# Patient Record
Sex: MALE | Race: WHITE | HISPANIC OR LATINO | Employment: OTHER | URBAN - METROPOLITAN AREA
[De-identification: names, ages, dates, MRNs, and addresses within clinical notes are randomized per-mention and may not be internally consistent; named-entity substitution may affect disease eponyms.]

---

## 2017-02-08 ENCOUNTER — APPOINTMENT (OUTPATIENT)
Dept: GENERAL RADIOLOGY | Facility: CLINIC | Age: 27
End: 2017-02-08
Attending: EMERGENCY MEDICINE

## 2017-02-08 ENCOUNTER — HOSPITAL ENCOUNTER (EMERGENCY)
Facility: CLINIC | Age: 27
Discharge: HOME | End: 2017-02-08
Attending: EMERGENCY MEDICINE | Admitting: EMERGENCY MEDICINE

## 2017-02-08 VITALS
DIASTOLIC BLOOD PRESSURE: 43 MMHG | HEIGHT: 72 IN | BODY MASS INDEX: 40.63 KG/M2 | WEIGHT: 300 LBS | HEART RATE: 129 BPM | RESPIRATION RATE: 20 BRPM | OXYGEN SATURATION: 95 % | TEMPERATURE: 97.1 F | SYSTOLIC BLOOD PRESSURE: 107 MMHG

## 2017-02-08 DIAGNOSIS — J10.1 INFLUENZA A: ICD-10-CM

## 2017-02-08 LAB
DEPRECATED S PYO AG THROAT QL EIA: NORMAL
FLUAV+FLUBV AG SPEC QL: ABNORMAL
FLUAV+FLUBV AG SPEC QL: POSITIVE
MICRO REPORT STATUS: NORMAL
SPECIMEN SOURCE: ABNORMAL
SPECIMEN SOURCE: NORMAL

## 2017-02-08 PROCEDURE — 96374 THER/PROPH/DIAG INJ IV PUSH: CPT

## 2017-02-08 PROCEDURE — 71020 XR CHEST 2 VW: CPT

## 2017-02-08 PROCEDURE — 96361 HYDRATE IV INFUSION ADD-ON: CPT

## 2017-02-08 PROCEDURE — 87804 INFLUENZA ASSAY W/OPTIC: CPT | Performed by: EMERGENCY MEDICINE

## 2017-02-08 PROCEDURE — 99284 EMERGENCY DEPT VISIT MOD MDM: CPT | Mod: 25

## 2017-02-08 PROCEDURE — 25000132 ZZH RX MED GY IP 250 OP 250 PS 637: Performed by: EMERGENCY MEDICINE

## 2017-02-08 PROCEDURE — 87880 STREP A ASSAY W/OPTIC: CPT | Performed by: EMERGENCY MEDICINE

## 2017-02-08 PROCEDURE — 25000128 H RX IP 250 OP 636: Performed by: EMERGENCY MEDICINE

## 2017-02-08 PROCEDURE — 87081 CULTURE SCREEN ONLY: CPT | Performed by: EMERGENCY MEDICINE

## 2017-02-08 RX ORDER — ONDANSETRON 4 MG/1
4 TABLET, ORALLY DISINTEGRATING ORAL EVERY 6 HOURS PRN
Qty: 15 TABLET | Refills: 0 | Status: SHIPPED | OUTPATIENT
Start: 2017-02-08 | End: 2017-02-11

## 2017-02-08 RX ORDER — ACETAMINOPHEN 500 MG
1000 TABLET ORAL ONCE
Status: COMPLETED | OUTPATIENT
Start: 2017-02-08 | End: 2017-02-08

## 2017-02-08 RX ORDER — IBUPROFEN 600 MG/1
600 TABLET, FILM COATED ORAL ONCE
Status: COMPLETED | OUTPATIENT
Start: 2017-02-08 | End: 2017-02-08

## 2017-02-08 RX ORDER — BENZONATATE 200 MG/1
200 CAPSULE ORAL 3 TIMES DAILY PRN
Qty: 21 CAPSULE | Refills: 0 | Status: SHIPPED | OUTPATIENT
Start: 2017-02-08

## 2017-02-08 RX ORDER — ONDANSETRON 2 MG/ML
8 INJECTION INTRAMUSCULAR; INTRAVENOUS ONCE
Status: COMPLETED | OUTPATIENT
Start: 2017-02-08 | End: 2017-02-08

## 2017-02-08 RX ADMIN — IBUPROFEN 600 MG: 600 TABLET ORAL at 16:20

## 2017-02-08 RX ADMIN — SODIUM CHLORIDE 1000 ML: 9 INJECTION, SOLUTION INTRAVENOUS at 17:01

## 2017-02-08 RX ADMIN — ACETAMINOPHEN 1000 MG: 500 TABLET, FILM COATED ORAL at 17:02

## 2017-02-08 RX ADMIN — ONDANSETRON 8 MG: 2 INJECTION INTRAMUSCULAR; INTRAVENOUS at 17:02

## 2017-02-08 ASSESSMENT — ENCOUNTER SYMPTOMS
FEVER: 1
SORE THROAT: 1
VOMITING: 0
CHILLS: 1
DIARRHEA: 1
SHORTNESS OF BREATH: 1
HEADACHES: 0
NECK PAIN: 0
RHINORRHEA: 0
NAUSEA: 1
COUGH: 1

## 2017-02-08 NOTE — ED NOTES
Sore throat and fever since yesterday.  Son had influenza last week.   Patient alert and oriented x3.  Airway, breathing and circulation intact.

## 2017-02-08 NOTE — ED AVS SNAPSHOT
[facility] Emergency Department    [address] [place] [address]    Phone:  [phone]    Fax:  [phone]                                       Devyn Carranza   MRN: [id number]    Department:  [facility] Emergency Department   Date of Visit:  2/8/2017           Patient Information     Date Of Birth          1990        Your diagnoses for this visit were:     Influenza A        You were seen by Negrito Baumann MD.        Discharge Instructions       Please make an appointment to follow up with [facility] [phone] in 5-7 days if not improving.      Discharge Instructions  Influenza    You were diagnosed today with influenza or influenza like illness.  Influenza is a respiratory illness caused by influenza A or B viruses.  Influenza causes fever, headache, muscle aches, and fatigue.  These symptoms start one to four days after you have been around a person with this illness.  People with influenza commonly have a dry cough, sore throat, and a runny nose.   Influenza is spread through sneezing and coughing and can live on surfaces for several days.  It is usually contagious for 5 days but in some cases up to 10 days and often affects several family members. If you have a family member who is less than 2 years old, older than 65 years old, pregnant or has a serious medical condition, they should be seen right away by a physician to decide if they should take preventative medications.      Return to the Emergency Department if:    You have trouble breathing.    You develop pain in your chest.    You have signs of being dehydrated, such as dizziness or unable to urinate at least three times daily.    You feel confused.    You cannot stop vomiting or you cannot drink enough fluids.    In children, you should seek help if the child has any of the above or if child:    Has blue or purplish skin color.    Is so irritable that he or  she does not want to be held.    Does not have tears when crying (in infants) or does not urinate at least three times daily.    Does not wake up easily.    Follow-up with your doctor if you are not improving after 5 days.    What can I do to help myself?    Rest.    Fluids -- Drink hydrating solutions such as Gatorade  or Pedialyte  as often as you can. If you are drinking enough, you should pass urine at least every eight hours.    Tylenol  (acetaminophen) and Advil  (ibuprofen) can relieve fever, headache, and muscle aches. Do not give aspirin to children under 18 years old.     Antiviral treatment -- Antiviral medicines do not make the flu symptoms go away immediately.  They have only been shown to make the symptoms go away 12 to 24 hours sooner than they would without treatment.       Antibiotics -- Antibiotics are NOT useful for treating viral illnesses such as influenza. Antibiotics should only be used if there is a bacterial complication of the flu such as bacterial pneumonia, ear infection, or sinusitis.    Because you were diagnosed with a flu like illness you are very contagious.  This means you cannot work, attend school or  for at least 24 hours or until you no longer have a fever.  If you were given a prescription for medicine here today, be sure to read all of the information (including the package insert) that comes with your prescription.  This will include important information about the medicine, its side effects, and any warnings that you need to know about.  The pharmacist who fills the prescription can provide more information and answer questions you may have about the medicine.  If you have questions or concerns that the pharmacist cannot address, please call or return to the Emergency Department.       24 Hour Appointment Hotline       To make an appointment at any [facility], call [phone] ([phone]). If you don't have a family doctor or clinic, we will help you find  one. [facility] are conveniently located to serve the needs of you and your family.             Review of your medicines      START taking        Dose / Directions Last dose taken    benzonatate 200 MG capsule   Commonly known as:  TESSALON   Dose:  200 mg   Quantity:  21 capsule        Take 1 capsule (200 mg) by mouth 3 times daily as needed for cough   Refills:  0        ondansetron 4 MG ODT tab   Commonly known as:  ZOFRAN ODT   Dose:  4 mg   Quantity:  15 tablet        Take 1 tablet (4 mg) by mouth every 6 hours as needed for nausea   Refills:  0          Our records show that you are taking the medicines listed below. If these are incorrect, please call your family doctor or clinic.        Dose / Directions Last dose taken    albuterol 108 (90 BASE) MCG/ACT Inhaler   Commonly known as:  PROAIR HFA/PROVENTIL HFA/VENTOLIN HFA   Dose:  2 puff   Quantity:  1 Inhaler        Inhale 2 puffs into the lungs every 6 hours as needed for shortness of breath / dyspnea or wheezing   Refills:  0        ibuprofen 200 MG tablet   Commonly known as:  ADVIL/MOTRIN   Dose:  600 mg   Quantity:  60 tablet        Take 3 tablets (600 mg) by mouth every 6 hours as needed for mild pain or fever   Refills:  0                Prescriptions were sent or printed at these locations (2 Prescriptions)                   Other Prescriptions                Printed at Department/Unit printer (2 of 2)         benzonatate (TESSALON) 200 MG capsule               ondansetron (ZOFRAN ODT) 4 MG ODT tab                Procedures and tests performed during your visit     Beta strep group A culture    Chest XR,  PA & LAT    Influenza A/B antigen    Rapid strep screen      Orders Needing Specimen Collection     None      Pending Results     Date and Time Order Name Status Description    2/8/2017 1617 Beta strep group A culture In process             Pending Culture Results     Date and Time Order Name Status Description    2/8/2017 1617 Beta strep  group A culture In process        Test Results from your hospital stay           2/8/2017  4:57 PM - Interface, Flexilab Results      Component Results     Component Value Ref Range & Units Status    Influenza A/B Agn Specimen Nasopharyngeal   Swab    Final    Influenza A Positive (A) NEG Final    Influenza B  NEG Final    Negative   Test results must be correlated with clinical data. If necessary, results   should be confirmed by a molecular assay or viral culture.           2/8/2017  4:53 PM - Interface, Flexilab Results      Component Results     Component    Specimen Description    Throat    Rapid Strep A Screen    NEGATIVE: No Group A streptococcal antigen detected by immunoassay, await   culture report.      Micro Report Status    FINAL 02/08/2017 2/8/2017  6:02 PM - Interface, Radiant Ib      Narrative     CHEST TWO VIEWS   2/8/2017 5:46 PM     HISTORY: Fever, cough; LLL rales.    COMPARISON: 1/18/2015.        Impression     IMPRESSION: Normal.    LORI PRITCHETT MD         2/8/2017  4:54 PM - Interface, Flexilab Results                Clinical Quality Measure: Blood Pressure Screening     Your blood pressure was checked while you were in the emergency department today. The last reading we obtained was  BP: 126/76 mmHg . Please read the guidelines below about what these numbers mean and what you should do about them.  If your systolic blood pressure (the top number) is less than 120 and your diastolic blood pressure (the bottom number) is less than 80, then your blood pressure is normal. There is nothing more that you need to do about it.  If your systolic blood pressure (the top number) is 120-139 or your diastolic blood pressure (the bottom number) is 80-89, your blood pressure may be higher than it should be. You should have your blood pressure rechecked within a year by a primary care provider.  If your systolic blood pressure (the top number) is 140 or greater or your diastolic blood pressure (the  "bottom number) is 90 or greater, you may have high blood pressure. High blood pressure is treatable, but if left untreated over time it can put you at risk for heart attack, stroke, or kidney failure. You should have your blood pressure rechecked by a primary care provider within the next 4 weeks.  If your provider in the emergency department today gave you specific instructions to follow-up with your doctor or provider even sooner than that, you should follow that instruction and not wait for up to 4 weeks for your follow-up visit.        Thank you for choosing [facility]       Thank you for choosing [facility] for your care. Our goal is always to provide you with excellent care. Hearing back from our patients is one way we can continue to improve our services. Please take a few minutes to complete the written survey that you may receive in the mail after you visit with us. Thank you!        [facility] Information     [facility] lets you send messages to your doctor, view your test results, renew your prescriptions, schedule appointments and more. To sign up, go to [email or web address] . Click on \"Log in\" on the left side of the screen, which will take you to the Welcome page. Then click on \"Sign up Now\" on the right side of the page.     You will be asked to enter the access code listed below, as well as some personal information. Please follow the directions to create your username and password.     Your access code is: [email or web address]  Expires: 3/15/2017  9:23 PM     Your access code will  in 90 days. If you need help or a new code, please call your [facility] clinic or [phone].        Care EveryWhere ID     This is your Care EveryWhere ID. This could be used by other organizations to access your [facility] medical records  [id number]        After Visit Summary       This is your record. Keep this with you and show to your community pharmacist(s) and doctor(s) at your next visit.                  "

## 2017-02-08 NOTE — ED AVS SNAPSHOT
[facility] Emergency Department    [address]    [facility] [address]    Phone:  [phone]    Fax:  [phone]                                       Devyn Carranza   MRN: [id number]    Department:  [facility] Emergency Department   Date of Visit:  2/8/2017           After Visit Summary Signature Page     I have received my discharge instructions, and my questions have been answered. I have discussed any challenges I see with this plan with the nurse or doctor.    ..........................................................................................................................................  Patient/Patient Representative Signature      ..........................................................................................................................................  Patient Representative Print Name and Relationship to Patient    ..................................................               ................................................  Date                                            Time    ..........................................................................................................................................  Reviewed by Signature/Title    ...................................................              ..............................................  Date                                                            Time

## 2017-02-08 NOTE — ED PROVIDER NOTES
History     Chief Complaint:  Fever    HPI   Devyn Carranza is a 26 year old male who presents with one day of multiple symptoms. He states that he has been experiencing fevers, chills, weakness, fatigue, myalgias, productive cough with resulting shortness of breath. He has also been nauseous with some diarrhea this morning, though no episodes of vomiting. He further endorses a sore throat, worse when swallowing. The patient denies any florian chest pain, headaches, neck stiffness, issues with ear pain, swelling in the legs or any urinary issues. To note, he has sick contacts with flu.     Allergies:  Penicillins      Medications:    albuterol (PROAIR HFA/PROVENTIL HFA/VENTOLIN HFA) 108 (90 BASE) MCG/ACT Inhaler   ibuprofen (ADVIL/MOTRIN) 200 MG tablet     Past Medical History:    Allergic state     Past Surgical History:    History reviewed. No pertinent past surgical history.      Family History:    History reviewed. No pertinent family history.       Social History:  The patient was accompanied to the ED by wife.  Smoking Status: Current smoker  Smokeless Tobacco: No  Alcohol Use: Yes   Marital Status:   [2]     Review of Systems   Constitutional: Positive for fever and chills.   HENT: Positive for sore throat. Negative for congestion and rhinorrhea.    Respiratory: Positive for cough and shortness of breath.    Cardiovascular: Negative for chest pain and leg swelling.   Gastrointestinal: Positive for nausea and diarrhea. Negative for vomiting.   Musculoskeletal: Negative for neck pain.   Neurological: Negative for headaches.   All other systems reviewed and are negative.    Physical Exam   Vitals:  Patient Vitals for the past 24 hrs:   BP Temp Temp src Pulse Heart Rate Resp SpO2 Height Weight   02/08/17 1715 126/76 mmHg - - - - - 96 % - -   02/08/17 1704 124/72 mmHg - - - - - - - -   02/08/17 1612 133/74 mmHg 103.1  F (39.5  C) Oral 129 129 20 99 % 1.829 m (6') 136.079 kg (300 lb)       Physical Exam    GEN:    Pleasant, age appropriate.     Resting comfortably in the bed.  HEENT:    Tympanic membranes are clear bilateral.      Oropharynx is moist.       No tonsillar erythema, exudate or asymmetric edema.     No deviation of the uvula.     No pooling of secretions, trismus or sublingual edema.  Eyes:    Conjunctiva normal, PERRL  Neck:    Supple, no meningismus.       No pain with manipulation of the hyoid.   CV:     Tachycardic, regular rhythm.     No murmurs, rubs or gallops.    PULM:    Inspiratory rales at left base alone.       No respiratory distress.       No stridor or wheezing.  ABD:    Soft, non-tender, non-distended.      No rebound or guarding.     No splenomegaly.  MSK:     No gross deformity to all four extremities.   LYMPH:   No cervical lymphadenopathy.  NEURO:   Alert.  Normal muscular tone, no atrophy.  Skin:    Hot, dry and intact.    PSYCH:    Mood is good and affect is appropriate.      Emergency Department Course     Imaging:  Radiology findings were communicated with the patient who voiced understanding of the findings.  XR Chest:  Normal  Final reading per radiology     Laboratory:  Laboratory findings were communicated with the patient who voiced understanding of the findings.  Rapid Strep: Negative  Influenza: Influenza A Positive     Interventions:  1620 Ibuprofen 600 mg PO   1701 Normal Saline 1000 mL IV  1702 Zofran 4 mg IV  1702 Acetaminophen 1000 mg PO     Emergency Department Course:  Nursing notes and vitals reviewed.  I performed an exam of the patient as documented above.   The patient was sent for a chest xray while in the emergency department, results above.      The patient was updated on the results of his labs and imaging. We discussed outpatient care.    I discussed the treatment plan with the patient. They expressed understanding of this plan and consented to discharge. They will be discharged home with instructions for care and follow up. In addition,  the patient will return to the emergency department if their symptoms persist, worsen, if new symptoms arise or if there is any concern.  All questions were answered.    I personally reviewed the laboratory results with the Patient and answered all related questions prior to discharge.    Impression & Plan      Medical Decision Making:  Devyn Carranza is a 26 year old male who presents to the emergency department today with influenza like symptoms and recent exposure to influenza. He had focal pulmonary finding is in the left lower lobe which chest xray was done, revealing no evidence of pneumonia. Influenza swab was positive. Based on duration of symptoms he is a tamiflu candidate. We talked about risks and benefits of Tamiflu and at this point, he would like withhold initiation of tamiflu which I think is appropriate. Patient is safe for discharge home.     Diagnosis:    ICD-10-CM    1. Influenza A J10.1       Disposition:   Discharge to home    Discharge Medications:  New Prescriptions    BENZONATATE (TESSALON) 200 MG CAPSULE    Take 1 capsule (200 mg) by mouth 3 times daily as needed for cough    ONDANSETRON (ZOFRAN ODT) 4 MG ODT TAB    Take 1 tablet (4 mg) by mouth every 6 hours as needed for nausea     Scribe Disclosure:  I, Ramón Brar, am serving as a scribe at 4:37 PM on 2/8/2017 to document services personally performed by Negrito Baumann MD, based on my observations and the provider's statements to me.   2/8/2017   [facility] EMERGENCY DEPARTMENT        Negrito Baumann MD  02/08/17 2144

## 2017-02-09 NOTE — DISCHARGE INSTRUCTIONS
Please make an appointment to follow up with [facility] [phone] in 5-7 days if not improving.      Discharge Instructions  Influenza    You were diagnosed today with influenza or influenza like illness.  Influenza is a respiratory illness caused by influenza A or B viruses.  Influenza causes fever, headache, muscle aches, and fatigue.  These symptoms start one to four days after you have been around a person with this illness.  People with influenza commonly have a dry cough, sore throat, and a runny nose.   Influenza is spread through sneezing and coughing and can live on surfaces for several days.  It is usually contagious for 5 days but in some cases up to 10 days and often affects several family members. If you have a family member who is less than 2 years old, older than 65 years old, pregnant or has a serious medical condition, they should be seen right away by a physician to decide if they should take preventative medications.      Return to the Emergency Department if:    You have trouble breathing.    You develop pain in your chest.    You have signs of being dehydrated, such as dizziness or unable to urinate at least three times daily.    You feel confused.    You cannot stop vomiting or you cannot drink enough fluids.    In children, you should seek help if the child has any of the above or if child:    Has blue or purplish skin color.    Is so irritable that he or she does not want to be held.    Does not have tears when crying (in infants) or does not urinate at least three times daily.    Does not wake up easily.    Follow-up with your doctor if you are not improving after 5 days.    What can I do to help myself?    Rest.    Fluids -- Drink hydrating solutions such as Gatorade  or Pedialyte  as often as you can. If you are drinking enough, you should pass urine at least every eight hours.    Tylenol  (acetaminophen) and Advil  (ibuprofen) can relieve fever, headache, and muscle  aches. Do not give aspirin to children under 18 years old.     Antiviral treatment -- Antiviral medicines do not make the flu symptoms go away immediately.  They have only been shown to make the symptoms go away 12 to 24 hours sooner than they would without treatment.       Antibiotics -- Antibiotics are NOT useful for treating viral illnesses such as influenza. Antibiotics should only be used if there is a bacterial complication of the flu such as bacterial pneumonia, ear infection, or sinusitis.    Because you were diagnosed with a flu like illness you are very contagious.  This means you cannot work, attend school or  for at least 24 hours or until you no longer have a fever.  If you were given a prescription for medicine here today, be sure to read all of the information (including the package insert) that comes with your prescription.  This will include important information about the medicine, its side effects, and any warnings that you need to know about.  The pharmacist who fills the prescription can provide more information and answer questions you may have about the medicine.  If you have questions or concerns that the pharmacist cannot address, please call or return to the Emergency Department.

## 2017-02-09 NOTE — ED NOTES
All patient questions have been answered, no further questions at this time. Patient verbalizes understanding of discharge teaching, medications and the importance of follow up.

## 2017-02-10 LAB
BACTERIA SPEC CULT: NORMAL
MICRO REPORT STATUS: NORMAL
SPECIMEN SOURCE: NORMAL

## 2019-11-27 ENCOUNTER — HOSPITAL ENCOUNTER (EMERGENCY)
Facility: CLINIC | Age: 29
Discharge: HOME | End: 2019-11-27
Attending: EMERGENCY MEDICINE | Admitting: EMERGENCY MEDICINE

## 2019-11-27 ENCOUNTER — APPOINTMENT (OUTPATIENT)
Dept: CT IMAGING | Facility: CLINIC | Age: 29
End: 2019-11-27
Attending: EMERGENCY MEDICINE

## 2019-11-27 ENCOUNTER — APPOINTMENT (OUTPATIENT)
Dept: ULTRASOUND IMAGING | Facility: CLINIC | Age: 29
End: 2019-11-27
Attending: EMERGENCY MEDICINE

## 2019-11-27 VITALS
BODY MASS INDEX: 42.46 KG/M2 | SYSTOLIC BLOOD PRESSURE: 125 MMHG | RESPIRATION RATE: 20 BRPM | OXYGEN SATURATION: 94 % | TEMPERATURE: 98.2 F | WEIGHT: 313.05 LBS | DIASTOLIC BLOOD PRESSURE: 76 MMHG | HEART RATE: 91 BPM

## 2019-11-27 DIAGNOSIS — N20.1 URETERAL STONE: ICD-10-CM

## 2019-11-27 DIAGNOSIS — N50.3 EPIDIDYMAL CYST: ICD-10-CM

## 2019-11-27 LAB
ALBUMIN UR-MCNC: 30 MG/DL
ANION GAP SERPL CALCULATED.3IONS-SCNC: 4 MMOL/L (ref 3–14)
APPEARANCE UR: CLEAR
BASOPHILS # BLD AUTO: 0 10E9/L (ref 0–0.2)
BASOPHILS NFR BLD AUTO: 0.3 %
BILIRUB UR QL STRIP: NEGATIVE
BUN SERPL-MCNC: 16 MG/DL (ref 7–30)
CALCIUM SERPL-MCNC: 9.5 MG/DL (ref 8.5–10.1)
CHLORIDE SERPL-SCNC: 105 MMOL/L (ref 94–109)
CO2 SERPL-SCNC: 29 MMOL/L (ref 20–32)
COLOR UR AUTO: YELLOW
CREAT SERPL-MCNC: 0.7 MG/DL (ref 0.66–1.25)
DIFFERENTIAL METHOD BLD: NORMAL
EOSINOPHIL # BLD AUTO: 0.1 10E9/L (ref 0–0.7)
EOSINOPHIL NFR BLD AUTO: 1 %
ERYTHROCYTE [DISTWIDTH] IN BLOOD BY AUTOMATED COUNT: 12.4 % (ref 10–15)
GFR SERPL CREATININE-BSD FRML MDRD: >90 ML/MIN/{1.73_M2}
GLUCOSE SERPL-MCNC: 127 MG/DL (ref 70–99)
GLUCOSE UR STRIP-MCNC: NEGATIVE MG/DL
HCT VFR BLD AUTO: 45.6 % (ref 40–53)
HGB BLD-MCNC: 15 G/DL (ref 13.3–17.7)
HGB UR QL STRIP: ABNORMAL
HYALINE CASTS #/AREA URNS LPF: 3 /LPF (ref 0–2)
IMM GRANULOCYTES # BLD: 0 10E9/L (ref 0–0.4)
IMM GRANULOCYTES NFR BLD: 0.3 %
KETONES UR STRIP-MCNC: ABNORMAL MG/DL
LEUKOCYTE ESTERASE UR QL STRIP: NEGATIVE
LYMPHOCYTES # BLD AUTO: 2.7 10E9/L (ref 0.8–5.3)
LYMPHOCYTES NFR BLD AUTO: 29.2 %
MCH RBC QN AUTO: 29.1 PG (ref 26.5–33)
MCHC RBC AUTO-ENTMCNC: 32.9 G/DL (ref 31.5–36.5)
MCV RBC AUTO: 89 FL (ref 78–100)
MONOCYTES # BLD AUTO: 0.8 10E9/L (ref 0–1.3)
MONOCYTES NFR BLD AUTO: 8.6 %
MUCOUS THREADS #/AREA URNS LPF: PRESENT /LPF
NEUTROPHILS # BLD AUTO: 5.6 10E9/L (ref 1.6–8.3)
NEUTROPHILS NFR BLD AUTO: 60.6 %
NITRATE UR QL: NEGATIVE
NRBC # BLD AUTO: 0 10*3/UL
NRBC BLD AUTO-RTO: 0 /100
PH UR STRIP: 5.5 PH (ref 5–7)
PLATELET # BLD AUTO: 259 10E9/L (ref 150–450)
POTASSIUM SERPL-SCNC: 3.9 MMOL/L (ref 3.4–5.3)
RBC # BLD AUTO: 5.15 10E12/L (ref 4.4–5.9)
RBC #/AREA URNS AUTO: 80 /HPF (ref 0–2)
SODIUM SERPL-SCNC: 138 MMOL/L (ref 133–144)
SOURCE: ABNORMAL
SP GR UR STRIP: 1.03 (ref 1–1.03)
SQUAMOUS #/AREA URNS AUTO: <1 /HPF (ref 0–1)
UROBILINOGEN UR STRIP-MCNC: NORMAL MG/DL (ref 0–2)
WBC # BLD AUTO: 9.3 10E9/L (ref 4–11)
WBC #/AREA URNS AUTO: 4 /HPF (ref 0–5)

## 2019-11-27 PROCEDURE — 81001 URINALYSIS AUTO W/SCOPE: CPT | Performed by: EMERGENCY MEDICINE

## 2019-11-27 PROCEDURE — 80048 BASIC METABOLIC PNL TOTAL CA: CPT | Performed by: EMERGENCY MEDICINE

## 2019-11-27 PROCEDURE — 93976 VASCULAR STUDY: CPT

## 2019-11-27 PROCEDURE — 74176 CT ABD & PELVIS W/O CONTRAST: CPT

## 2019-11-27 PROCEDURE — 85025 COMPLETE CBC W/AUTO DIFF WBC: CPT | Performed by: EMERGENCY MEDICINE

## 2019-11-27 PROCEDURE — 99285 EMERGENCY DEPT VISIT HI MDM: CPT | Mod: 25

## 2019-11-27 RX ORDER — TAMSULOSIN HYDROCHLORIDE 0.4 MG/1
0.4 CAPSULE ORAL DAILY
Qty: 10 CAPSULE | Refills: 0 | Status: SHIPPED | OUTPATIENT
Start: 2019-11-27 | End: 2019-12-07

## 2019-11-27 RX ORDER — OXYCODONE HYDROCHLORIDE 5 MG/1
5 TABLET ORAL EVERY 6 HOURS PRN
Qty: 6 TABLET | Refills: 0 | Status: SHIPPED | OUTPATIENT
Start: 2019-11-27

## 2019-11-27 RX ORDER — ONDANSETRON 4 MG/1
4 TABLET, ORALLY DISINTEGRATING ORAL EVERY 8 HOURS PRN
Qty: 10 TABLET | Refills: 0 | Status: SHIPPED | OUTPATIENT
Start: 2019-11-27 | End: 2019-11-30

## 2019-11-27 ASSESSMENT — ENCOUNTER SYMPTOMS
FEVER: 0
FREQUENCY: 1
BACK PAIN: 1
CHILLS: 1
DYSURIA: 1

## 2019-11-27 NOTE — ED AVS SNAPSHOT
[facility] Emergency Department  [address]  [facility] [address]  Phone:  [phone]  Fax:  [phone]                                    Devyn Carranza   MRN: [id number]    Department:  [facility] Emergency Department   Date of Visit:  11/27/2019           After Visit Summary Signature Page    I have received my discharge instructions, and my questions have been answered. I have discussed any challenges I see with this plan with the nurse or doctor.    ..........................................................................................................................................  Patient/Patient Representative Signature      ..........................................................................................................................................  Patient Representative Print Name and Relationship to Patient    ..................................................               ................................................  Date                                   Time    ..........................................................................................................................................  Reviewed by Signature/Title    ...................................................              ..............................................  Date                                               Time          22EPIC Rev 08/18

## 2019-11-28 NOTE — ED PROVIDER NOTES
History     Chief Complaint:  Back Pain    HPI   Devyn Carranza is a 29 year old male, with a history of type II DM, who presents to the ED for evaluation of back pain. The patient states that he developed lower left-sided back pain yesterday. He reports that the pain radiated around to the left side of his abdomen and testicle. They pain resolved last night and then returned again today around 1730 that resolved after taking Aleve. He also complains of dysuria, frequency, and chills. He denies any fevers or history of similar symptoms. He also notes that he occasionally has some penile discomfort and red urine after drinking Red Bull and he drank a Red Bull three days ago.      Allergies:  Penicillins     Medications:    The patient is currently on no regular medications.    Past Medical History:    Type II DM    Past Surgical History:    The patient does not have any pertinent past surgical history.    Family History:    Father: kidney stones    Social History:  Current some day smoker.  Positive for alcohol use.   Denies drug use.   Presents with his wife.   Marital Status:        Review of Systems   Constitutional: Positive for chills. Negative for fever.   Genitourinary: Positive for dysuria and frequency.   Musculoskeletal: Positive for back pain.   All other systems reviewed and are negative.      Physical Exam   First Vitals:  BP: (!) 144/89  Pulse: 86  Heart Rate: 87  Temp: 98.2  F (36.8  C)  Resp: 20  Weight: 142 kg (313 lb 0.9 oz)  SpO2: 100 %      Physical Exam  General:              Well-nourished              Speaking in full sentences  Eyes:              Conjunctiva without injection or scleral icterus  ENT:              Moist mucous membranes              Nares patent              Pinnae normal  Neck:              Full ROM              No stiffness appreciated  Resp:              Lungs CTAB              No crackles, wheezing or audible rubs              Good air movement  CV:                     Normal rate, regular rhythm              S1 and S2 present              No murmur, gallop or rub  GI:              BS present              Abdomen soft without distention              Non-tender to light and deep palpation              No guarding or rebound tenderness  :              Uncircumcised male              Testes palpable bilaterally in vertical lie              No overlying skin changes  Skin:              Warm, dry, well perfused              No rashes or open wounds on exposed skin  MSK:              Moves all extremities              No focal deformities or swelling  Neuro:              Alert              Answers questions appropriately              Moves all extremities equally              Gait stable  Psych:              Normal affect, normal mood    Emergency Department Course   Imaging:  Radiographic findings were communicated with the patient who voiced understanding of the findings.  US Testicular and Scrotum w Doppler Ltd:   IMPRESSION:  1.  Normal ultrasound of the scrotum as per radiology.    CT Abdomen pelvis without contrast:   IMPRESSION:   1.  Mild left obstructive uropathy secondary to a 6 mm stone in the proximal left ureter just below the ureteropelvic junction.  2.  Nonobstructing 4 mm stone in the lower pole of the right kidney as per radiology.    Laboratory:  CBC: WBC: 9.3, HGB: 15.0, PLT: 259  BMP: Glucose 127 (H), o/w WNL (Creatinine: 0.70)    UA with Microscopic: Ketone: trace, Blood: moderate, Protein albumin: 30, RBC: 80, Mucous: present, hyaline casts: 3, o/w WNL    Emergency Department Course:  Nursing notes and vitals reviewed. (2027) I performed an exam of the patient as documented above.     The patient provided a urine sample here in the emergency department. This was sent for laboratory testing, findings above.     Blood drawn. This was sent to the lab for further testing, results above.     The patient was sent for a testicular and scrotum US and  abd/pelvis CT while in the emergency department, findings above.     2232 I rechecked the patient and discussed the results of his workup thus far. Patient states he feels well and is ready to go.     Findings and plan explained to the Patient. Patient discharged home with instructions regarding supportive care, medications, and reasons to return. The importance of close follow-up was reviewed. The patient was prescribed Zofran, Oxycodone, and Flomax.     I personally reviewed the laboratory results with the Patient and answered all related questions prior to discharge.   Impression & Plan    Medical Decision Making:  Devyn Carranza is a 29 year old male who presented to the ER for evaluation of flank pain and abdominal pain.  Vital signs on presentation reveal elevated BP, though are otherwise unremarkable.  Differential diagnosis includes nephrolithiasis/renal colic, pyelonephritis, appendicitis, AAA, biliary colic, bowel obstruction, colitis, renal infarction, retroperitoneal disease, and testicular pathology such as torsion, epididymitis, hernia.      Patient's current presentation is felt to be most consistent with renal colic. CT confirms a 6mm ureteral stone at the proximal left ureter just below the ureteropelvic junction.  Renal function is normal/baseline.  CT and lab workup show no other alternative etiology that could be causing his symptoms (e.g., AAA, appendicitis, pyelonephritis). There is no fever or convincing evidence of a urinary tract infection.  Pt informed of the incidentally noted left epididymal cyst.    On recheck, his pain is controlled and he is tolerating POs. I will prescribe supportive medications and Flomax to facilitate stone passage.  Opiate precautions discussed including avoidance of driving or operating heavy machinery while taking this. I have advised him to return for uncontrolled pain, vomiting, fever, or any other concerning symptoms. I also advised to strain his  urine to look for a stone and submit it to his primary doctor for lab analysis.  Finally, I have advised follow up with urology within 3-5 days.    Diagnosis:    ICD-10-CM    1. Ureteral stone N20.1        Disposition:  discharged to home    Discharge Medications:  New Prescriptions    ONDANSETRON (ZOFRAN ODT) 4 MG ODT TAB    Take 1 tablet (4 mg) by mouth every 8 hours as needed    OXYCODONE (ROXICODONE) 5 MG TABLET    Take 1 tablet (5 mg) by mouth every 6 hours as needed for pain No driving or operating heavy machinery while taking this medication.  You may take a stool softener with this medication as it may cause constipation.    TAMSULOSIN (FLOMAX) 0.4 MG CAPSULE    Take 1 capsule (0.4 mg) by mouth daily for 10 doses     Scribe Disclosure:  I,  Jesus Arrieta, am serving as a scribe on 11/27/2019 at 8:27 PM to personally document services performed by Sukh Kruger MD based on my observations and the provider's statements to me.     Jesus Arrieta  11/27/2019   [facility] EMERGENCY DEPARTMENT       Sukh Kruger MD  11/28/19 0131

## 2019-11-28 NOTE — ED TRIAGE NOTES
Here for lower back paint radiating to left abdomen/testicle started yesterday that went away. Pain return at 5:30pm, took Aleeve and pain resolve again. Patient when he drinks red bull, these symptoms will occur. ABCs intact.

## 2019-11-28 NOTE — DISCHARGE INSTRUCTIONS
Follow-up:  Please follow-up with Urology or your Primary Care Provider in 3-5 days for re-evaluation and discussion of your visit to the emergency department today.  Try to catch your kidney stone using the urinary strainer, and bring this with you to your urology appointment.  Determining what kind of kidney stone you have might help preventing them in the future.    Home treatments:  Recommended home therapies include drinking plenty of fluids to stay hydrated.  You may resume your regular diet.  Take your medications as prescribed.    New prescriptions:  Ibuprofen for pain    Zofran for nausea    Oxycodone for pain which does not respond to ibuprofen (Addictive potential, do NOT drive or operate heavy machinery while taking this medication.  Be sure to take with a stool softener as this may cause constipation)    Flomax to aid with stone passage.    Return precautions:  Warning signs which should prompt you to return to the ER include if you develop fever, develop recurrent vomiting, are unable to urinate, cannot keep fluids down, or become worse in any way.      Discharge Instructions  Kidney Stones    Kidney stones are a common problem that can cause a lot of pain but fortunately are usually not dangerous and can be generally treated with medicine at home.  However, sometimes your condition may be worse than it seemed at first, or may get worse with time.     You need to follow-up with your regular doctor within 3 days.    Most kidney stones will pass on their own, but occasionally stones may need to be removed by an urologist. We will send you home with a urine strainer. Be sure to urinate into this, or urinate into a container and pour the urine through the fine filter to catch the kidney stone as it comes out. The stone will seem like a pebble or grain of sand. Be sure to save this in a zip-lock bag and take it to the doctor s office with you.       Return to the Emergency Department if:  Your pain is not  controlled.  You are vomiting and can t keep fluids or medications down.  You develop fever (>101)  You feel much more ill or develop new symptoms  What can I do to help myself?  Be sure to drink plenty of fluids  Staying active is good, and may help the stone to pass. You may do whatever you feel up to doing without restrictions.   Treatment:  Non-steroidal anti-inflammatory drugs (NSAIDs). This includes prescription medicines like Toradol   and non-prescription medicines like ibuprofen (Advil , Nuprin  ). These pain relievers are very effective for kidney stones.  Narcotic pain pills. If you have been given a narcotic (such as codeine, hydrocodone, or oxycodone) do not drive for four hours after you have taken it. If the narcotic contains acetaminophen (Tylenol), do not take Tylenol with it. All narcotics will cause constipation, so eat a high fiber diet.    Nausea medication.  Nausea and vomiting are common with kidney stones, so your physician may send you home with medicine for this.   Flomax (Tamsulosin). This medicine is sometimes used for men with prostate problems, but also can help kidney stones to pass. This medicine can lower blood pressure, and you may feel faint, especially when you first stand up. Be sure to get up gradually, sit down if you feel faint, and avoid activity where feeling faint would be dangerous, such as climbing ladders.     Remember that you can always come back to the Emergency Department if you are not able to see your regular doctor in the amount of time listed above, if you get any new symptoms, or if there is anything that worries you.      Opioid Medication Information    You have been given a prescription for an opioid (narcotic) pain medicine and/or have received a pain medicine while here in the Emergency Department. These medicines can make you drowsy or impaired. You must not drive, operate dangerous equipment, or engage in any other dangerous activities while taking these  medications. If you drive while taking these medications, you could be arrested for DUI, or driving under the influence. Do not drink any alcohol while you are taking these medications.   Opioid pain medications can cause addiction. If you have a history of chemical dependency of any type, you are at a higher risk of becoming addicted to pain medications.  Only take these prescribed medications to treat your pain when all other options have been tried. Take it for as short a time and as few doses as possible. Store your pain pills in a secure place, as they are frequently stolen and provide a dangerous opportunity for children or visitors in your house to start abusing these powerful medications. We will not replace any lost or stolen medicine.  As soon as your pain is better, you should flush all your remaining medication.   Many prescription pain medications contain Tylenol  (acetaminophen), including Vicodin , Tylenol #3 , Norco , Lortab , and Percocet .  You should not take any extra pills of Tylenol  if you are using these prescription medications or you can get very sick.  Do not ever take more than 4000 mg of acetaminophen in any 24 hour period.  All opioids tend to cause constipation. Drink plenty of water and eat foods that have a lot of fiber, such as fruits, vegetables, prune juice, apple juice and high fiber cereal.  Take a laxative if you don t move your bowels at least every other day. Miralax , Milk of Magnesia, Colace , or Senna  can be used to keep you regular.

## 2020-02-19 ENCOUNTER — OFFICE VISIT - HEALTHEAST (OUTPATIENT)
Dept: UROLOGY | Facility: CLINIC | Age: 30
End: 2020-02-19

## 2020-02-19 ENCOUNTER — AMBULATORY - HEALTHEAST (OUTPATIENT)
Dept: UROLOGY | Facility: CLINIC | Age: 30
End: 2020-02-19

## 2020-02-19 ENCOUNTER — HOSPITAL ENCOUNTER (OUTPATIENT)
Dept: CT IMAGING | Facility: CLINIC | Age: 30
Discharge: HOME | End: 2020-02-19
Attending: PHYSICIAN ASSISTANT

## 2020-02-19 DIAGNOSIS — N20.0 CALCULUS OF KIDNEY: ICD-10-CM

## 2020-02-19 DIAGNOSIS — N20.1 CALCULUS OF URETER: ICD-10-CM

## 2020-02-19 DIAGNOSIS — N13.2 HYDRONEPHROSIS WITH URINARY OBSTRUCTION DUE TO URETERAL CALCULUS: ICD-10-CM

## 2020-02-19 LAB
ALBUMIN UR-MCNC: NEGATIVE MG/DL
APPEARANCE UR: CLEAR
BILIRUB UR QL STRIP: NEGATIVE
COLOR UR AUTO: YELLOW
GLUCOSE UR STRIP-MCNC: NEGATIVE MG/DL
HGB UR QL STRIP: ABNORMAL
KETONES UR STRIP-MCNC: NEGATIVE MG/DL
LEUKOCYTE ESTERASE UR QL STRIP: NEGATIVE
NITRATE UR QL: NEGATIVE
PH UR STRIP: 5 [PH] (ref 5–8)
SP GR UR STRIP: 1.02 (ref 1–1.03)
UROBILINOGEN UR STRIP-ACNC: ABNORMAL

## 2020-02-19 ASSESSMENT — MIFFLIN-ST. JEOR: SCORE: 2445.44

## 2020-02-24 ENCOUNTER — SURGERY - HEALTHEAST (OUTPATIENT)
Dept: UROLOGY | Facility: CLINIC | Age: 30
End: 2020-02-24

## 2020-02-24 ENCOUNTER — ANESTHESIA - HEALTHEAST (OUTPATIENT)
Dept: SURGERY | Facility: CLINIC | Age: 30
End: 2020-02-24

## 2020-02-24 ENCOUNTER — SURGERY - HEALTHEAST (OUTPATIENT)
Dept: SURGERY | Facility: CLINIC | Age: 30
End: 2020-02-24

## 2020-02-24 ENCOUNTER — COMMUNICATION - HEALTHEAST (OUTPATIENT)
Dept: UROLOGY | Facility: CLINIC | Age: 30
End: 2020-02-24

## 2020-02-24 DIAGNOSIS — N20.1 CALCULUS OF URETER: ICD-10-CM

## 2020-02-24 ASSESSMENT — MIFFLIN-ST. JEOR: SCORE: 2445.44

## 2020-02-28 ENCOUNTER — COMMUNICATION - HEALTHEAST (OUTPATIENT)
Dept: UROLOGY | Facility: CLINIC | Age: 30
End: 2020-02-28

## 2020-02-28 DIAGNOSIS — R11.0 NAUSEA: ICD-10-CM

## 2020-02-28 DIAGNOSIS — N20.1 CALCULUS OF URETER: ICD-10-CM

## 2020-03-17 ENCOUNTER — COMMUNICATION - HEALTHEAST (OUTPATIENT)
Dept: UROLOGY | Facility: CLINIC | Age: 30
End: 2020-03-17

## 2020-03-18 ENCOUNTER — COMMUNICATION - HEALTHEAST (OUTPATIENT)
Dept: UROLOGY | Facility: CLINIC | Age: 30
End: 2020-03-18

## 2020-04-24 ENCOUNTER — COMMUNICATION - HEALTHEAST (OUTPATIENT)
Dept: UROLOGY | Facility: CLINIC | Age: 30
End: 2020-04-24

## 2020-04-24 DIAGNOSIS — N20.1 CALCULUS OF URETER: ICD-10-CM

## 2021-06-04 VITALS
TEMPERATURE: 98.2 F | WEIGHT: 315 LBS | SYSTOLIC BLOOD PRESSURE: 146 MMHG | DIASTOLIC BLOOD PRESSURE: 76 MMHG | HEART RATE: 86 BPM | HEIGHT: 72 IN | BODY MASS INDEX: 42.66 KG/M2

## 2021-06-04 VITALS — BODY MASS INDEX: 42.66 KG/M2 | HEIGHT: 72 IN | WEIGHT: 315 LBS

## 2021-06-06 NOTE — TELEPHONE ENCOUNTER
LM for patient to reschedule for tele visit.  If no issues, would obtain post op renal US and can call with results    Currently, CT is ordered but I think patient may be uninsured?

## 2021-06-06 NOTE — ANESTHESIA PREPROCEDURE EVALUATION
Anesthesia Evaluation      Patient summary reviewed   No history of anesthetic complications     Airway   Mallampati: II  Neck ROM: full  Comment: Obese   Pulmonary - normal exam   (+) a smoker                         Cardiovascular - negative ROS and normal exam  Exercise tolerance: > or = 4 METS   Neuro/Psych - negative ROS     Endo/Other    (+) diabetes mellitus type 2, obesity (BMI 43),      GI/Hepatic/Renal    (+)   chronic renal disease (Kidney stones),           Dental - normal exam                        Anesthesia Plan  Planned anesthetic: general endotracheal  Glidescope    Decadron 4 mg  Zofran  ASA 3   Induction: intravenous   Anesthetic plan and risks discussed with: patient  Anesthesia plan special considerations: video-assisted, antiemetics,   Post-op plan: routine recovery

## 2021-06-06 NOTE — PATIENT INSTRUCTIONS - HE
Patient Stated Goal: Pass my stone  Symptom Control While Passing A Stone    The goal of [facility] is to let a smaller kidney stone (less than 4 to 5 mm) pass without intervention if possible. Giving your body a chance to clear the stone may take a few hours up to a few weeks.  Keeping you well-informed, safe and fairly comfortable is important.    Drink to thirst  Do not attempt to  flush out  your stone by drinking too much fluid. This does not work and may increase nausea. Drink enough to satisfy your body s thirst. Eating your normal diet is fine.   Medications (that may be suggested or prescribed)    Ibuprofen (Advil or Motrin) Available over the counter  o Take two (200mg) tablets every six hours until the stone passes.  o Prevents spasm of the ureter.    o Decreases pain.      Dramamine* (drowsy version, non-generic formulation) Available over the counter  o Take 50mg at bedtime  o Decreases spasms of the ureter  o Decreases nausea  o Decreases acute pain  o Decreases recurrence of pain for 24 hours  o Will help you sleep  *This medication will cause increase drowsiness, do not drive or operate machinery for 6 hours.      Narcotics (Percocet, Vicodin, Dilaudid) Take as prescribed for severe pain unrelieved by ibuprofen and Dramamine  o Narcotics have significant side effects and only  cover-up  pain. They have no effect on the cause of pain.  o Common side effects  - Confusion, disorientation and sedation - DO NOT DRIVE OR OPERATE MACHINERY WITHIN 24 HOURS  - Nausea - take Dramamine or Zofran or Haldol to help control  - Constipation  - Sleep disturbances      Ondansetron (Zofran) Take as prescribed  o Reserve for severe nausea  o May cause constipation, start over the counter Miralax if needed      Second Line Anti-Nausea Medication: Adding a different anti-nausea medication maybe helpful for persistent nausea.  The combined effect of different types of anti-nausea medications maybe more  effective than either medication by itself, even in higher doses.  o Compazine: Take as prescribed      Information about kidney stones    Crystals can form if chemicals are too concentrated in your urine. If the crystal grows over time, a stone may form. A stone usually isn t painful while it is still in the kidney.    As the stone begins to leave the kidney, you may experience episodes of flank pain as the kidney stone approaches the entrance to the ureter. Some people feel a vague ache in the side.    Kidney stones may fall into the ureter. Some stones are tiny and pass through without causing symptoms. The ureter is a small tube (approximately 1/8 of an inch wide). A kidney stone can get stuck and block the ureter. If this happens, urine backs up and flows back to the kidney. Back pressure on the kidney can cause:  o Severe flank pain radiating to the groin.  o Severe nausea and vomiting.  o The pain can occur in the lower back, side, groin or all three.      When the stone reaches the lower ureter, this can irritate the bladder and sensations of feeling the urge to urinate frequently and urgently may occur.      Once the stone passes out of your ureter and into your bladder, the symptoms of urgency and frequency will often disappear. Sometimes pain will come back for a short period and will not be as severe as before. The passage of the stone from your bladder and out of your body is usually not a problem. The urethra is at least twice as wide as the normal ureter, so the stone doesn t usually block it.    Strain all urine  If you pass the stone, save it. Place it in the container we have provided and bring it to the [facility] within a week of passing it. Your stone will then be sent for analysis which takes about a month.     Signs and symptoms you might experience    Nausea    Decreased appetite    Urinary frequency    Bloody urine     Chills    Fatigue    When to call [facility] or  go to the Emergency Room    Fever with a temperature greater than 100.1    Severe pain    Persistent nausea/vomiting    If the pain worsens or nausea/vomiting is uncontrolled with medications, STOP eating & drinking. You need to have an empty stomach for 8 hours prior to surgery. Call the [facility] immediately at [phone].           Follow-up    Low dose CT scan with doctor visit 1-2 weeks after initial clinic visit per doctor s instructions    Please cancel the CT scan visit if you pass a stone. Reschedule for a one month follow-up with doctor to discuss stone composition and future prevention.    Preventing future stones    Approximately a month after your stone is sent out for analysis, a prevention visit will occur with your provider, to discuss an individualized plan for prevention of new stones and to discuss managing stones that you may still have. Along with the analysis of the kidney stone, blood and urine tests may be indicated to develop this plan. Knowing the type of kidney stones you make, and why, allows the providers at the [facility] to recommend specific ways to prevent them.    Follow-up visits are an important part of monitoring and preventing future re-occurrences.    The [facility] is available for questions or concerns 24 hours a day at [phone]

## 2021-06-06 NOTE — ANESTHESIA CARE TRANSFER NOTE
Last vitals:   Vitals:    02/24/20 1621   BP: 136/79   Pulse: 96   Resp: 16   Temp: 36.8  C (98.3  F)   SpO2: 98%     Patient's level of consciousness is drowsy  Spontaneous respirations: yes  Maintains airway independently: yes  Dentition unchanged: yes  Oropharynx: oropharynx clear of all foreign objects    QCDR Measures:  ASA# 20 - Surgical Safety Checklist: WHO surgical safety checklist completed prior to induction    PQRS# 430 - Adult PONV Prevention: 4558F - Pt received => 2 anti-emetic agents (different classes) preop & intraop  ASA# 8 - Peds PONV Prevention: NA - Not pediatric patient, not GA or 2 or more risk factors NOT present  PQRS# 424 - [place]-op Temp Management: 4559F - At least one body temp DOCUMENTED => 35.5C or 95.9F within required timeframe  PQRS# 426 - PACU Transfer Protocol: - Transfer of care checklist used  ASA# 14 - Acute Post-op Pain: ASA14B - Patient did NOT experience pain >= 7 out of 10

## 2021-06-06 NOTE — ANESTHESIA POSTPROCEDURE EVALUATION
Patient: Devyn Perez  Procedure(s):  CYSTOSCOPY, WITH RIGID URETEROSCOPIC CALCULUS REMOVAL LASER LITHOTRIPSY  AND STENT INSERTION LEFT (Left)  Anesthesia type: general    Patient location: PACU  Last vitals:   Vitals Value Taken Time   /63 2/24/2020  5:35 PM   Temp 36.8  C (98.3  F) 2/24/2020  4:21 PM   Pulse 80 2/24/2020  5:35 PM   Resp 17 2/24/2020  5:35 PM   SpO2 98 % 2/24/2020  5:35 PM     Post vital signs: stable  Level of consciousness: awake, alert, oriented and responds to simple questions  Post-anesthesia pain: pain controlled  Post-anesthesia nausea and vomiting: no  Pulmonary: unassisted, return to baseline  Cardiovascular: stable and blood pressure at baseline  Hydration: adequate  Anesthetic events: no    QCDR Measures:  ASA# 11 - [place]-op Cardiac Arrest: ASA11B - Patient did NOT experience unanticipated cardiac arrest  ASA# 12 - [place]-op Mortality Rate: ASA12B - Patient did NOT die  ASA# 13 - PACU Re-Intubation Rate: ASA13B - Patient did NOT require a new airway mgmt  ASA# 10 - Composite Anes Safety: ASA10A - No serious adverse event    Additional Notes:

## 2021-06-06 NOTE — PROGRESS NOTES
Assessment/Plan:        Diagnoses and all orders for this visit:    Calculus of ureter  -     Urinalysis Macroscopic    Hydronephrosis with urinary obstruction due to ureteral calculus    Calculus of kidney    Other orders  -     oxyCODONE (ROXICODONE) 5 MG immediate release tablet; Take 5 mg by mouth.  -     ibuprofen (ADVIL,MOTRIN) 200 MG tablet; Take 600 mg by mouth.  -     metFORMIN (GLUCOPHAGE) 1000 MG tablet; Take 1,000 mg by mouth 2 (two) times a day with meals.      Stone Management Plan  [facility] 2/19/2020   Urinary Tract Infection No suspicion of infection   Renal Colic Well controlled symptoms   Renal Failure No suspicion of renal failure   Current CT date 2/19/2020   Right sided stones? Yes   R Number of ureteral stones No ureteral stones   R Number of kidney stones  1   R GSD of kidney stones 4 - 10   R Hydronephrosis None   R Stone Event No current event   R Current Plan Observe   Observe rationale Limited stone burden with good prognosis for spontaneous passage   Left sided stones? Yes   L Number of ureteral stones 1   L GSD of ureteral stones 8   L Location of ureteral stone Distal   L Number of kidney stones  No renal stones   L GSD of kidney stones N/A   L Hydronephrosis Mild   L Stone Event New event   Diagnosis date 11/27/2019   Initial location of primary symptomatic stone Proximal   Initial GSD of primary symptomatic stone 7   L MET Status Initiation   L Current Plan MET   MET (No Data)         Subjective:      HPI  Mr. Devyn Perez is a 29 y.o. male presenting to the [facility]     He is a first time unidentified composition stone former. He has no identified modifiable stone risk factors. He has no identified non-modifiable stone risk factors.    He was see through [facility] ER 11/27/19 for acute onset left back pain x 1 day. The pain was sharp and constant, radiating to the left abdomen and testicle. He found relief with Aleve. He had  associated symptoms of urinary frequency, dysuria and chills. Workup was notable for CT reporting an obstructing left ureteral stone and contralateral nonobstructing renal stone. Labs were negative for infection or renal injury. He was sent home with zofran, flomax and oxycodone.    He had recurrence of pain at the beginning of this month with associated nausea and hematuria. He has been managing the pain with Aleve. He is asymptomatic at present. He denies symptoms of fever, chills, flank pain, nausea, vomiting, urinary frequency and dysuria.     CT scan from 11/27/19 is personally reviewed and demonstrates a mildly obstructing 7 mm left proximal ureteral stone and a 4-5 mm nonobstructing right lower pole renal stone.     CT scan from today is personally reviewed and demonstrates persistent left ureteral stone which has migrated into distal ureter, measures 8 mm. Mild left hydronephrosis. No change to right renal stone.    Significant labs from presentation include moderate hematuria, no pyuria, negative nitrite, no bacteria, normal WBC, normal creatinine and normal potassium.    PLAN    30 yo diabetic M first time stone former with long standing left ureteral stone, now within distal ureter with mild hydronephrosis, pain currently controlled. Nonobstructing right renal stone.    He is uninsured and is concerning about applying for EMA given his immigration status. We discussed issues with prolonged urolithiasis including recurrent renal colic, infections and renal injury. He would prefer to attempt continued trial of passage versus surgical intervention.    Will proceed with medical expulsive therapy. Risks and benefits were detailed of medical expulsive therapy including probability of stone passage, recurrent renal colic, and requirement of emergency medical and/or surgical care and further imaging. Patient verbalized understanding. Patient agrees with plan as discussed. He will return in 4 weeks with pelvic CT  scan.    For symptom control, he was prescribed oxycodone and Flomax. Over the counter symptom control medications of ibuprofen, Dramamine and Tylenol were recommended.     Patient also seen and examined by SAMIRA Ferguson   Review of Systems  A 12 point comprehensive review of systems is negative except for HPI    Past Medical History:   Diagnosis Date     Diabetes mellitus (H)      Kidney stone      Past Surgical History:   Procedure Laterality Date     NO PAST SURGERIES         Current Outpatient Medications   Medication Sig Dispense Refill     ibuprofen (ADVIL,MOTRIN) 200 MG tablet Take 600 mg by mouth.       metFORMIN (GLUCOPHAGE) 1000 MG tablet Take 1,000 mg by mouth 2 (two) times a day with meals.       oxyCODONE (ROXICODONE) 5 MG immediate release tablet Take 5 mg by mouth.       No current facility-administered medications for this visit.        Allergies   Allergen Reactions     Penicillins        Social History     Socioeconomic History     Marital status:      Spouse name: Not on file     Number of children: Not on file     Years of education: Not on file     Highest education level: Not on file   Occupational History     Occupation:    Social Needs     Financial resource strain: Not on file     Food insecurity:     Worry: Not on file     Inability: Not on file     Transportation needs:     Medical: Not on file     Non-medical: Not on file   Tobacco Use     Smoking status: Current Some Day Smoker     Smokeless tobacco: Never Used   Substance and Sexual Activity     Alcohol use: Yes     Comment: occas     Drug use: Not on file     Sexual activity: Not on file   Lifestyle     Physical activity:     Days per week: Not on file     Minutes per session: Not on file     Stress: Not on file   Relationships     Social connections:     Talks on phone: Not on file     Gets together: Not on file     Attends Jainism service: Not on file     Active member of club or organization:  Not on file     Attends meetings of clubs or organizations: Not on file     Relationship status: Not on file     Intimate partner violence:     Fear of current or ex partner: Not on file     Emotionally abused: Not on file     Physically abused: Not on file     Forced sexual activity: Not on file   Other Topics Concern     Not on file   Social History Narrative     Not on file       Family History   Problem Relation Age of Onset     Diabetes Mother      Urolithiasis Father      Diabetes Maternal Uncle      Urolithiasis Paternal Uncle      Urolithiasis Paternal Uncle      Clotting disorder Neg Hx      Gout Neg Hx      Cancer Neg Hx      Heart disease Neg Hx        Objective:      Physical Exam  Vitals:    02/19/20 1452   BP: 146/76   Pulse: 86   Temp: 98.2  F (36.8  C)     General - well developed, well nourished, appropriate for age. Appears no distress at this time   Heart - regular rate and rhythm, no murmur  Respiratory - normal effort, clear to auscultation, good air entry without adventitious noises  Abdomen - morbidly obese soft, non-tender, no hepatosplenomegaly, no masses.   - no flank tenderness, no suprapubic tenderness, kidney and bladder non-palpable  MSK - normal spinal curvature. no spinal tenderness. normal gait. muscular strength intact.  Neurology - cranial nerves II-XII grossly intact, normal sensation, no unsteadiness  Skin - intact, no bruising, no gouty tophi  Psych - oriented to time, place, and person, normal mood and affect.    Labs  Urinalysis POC (Office):  Nitrite, UA   Date Value Ref Range Status   02/19/2020 Negative Negative Final       Lab Urinalysis:  Blood, UA   Date Value Ref Range Status   02/19/2020 Trace (!) Negative Final     Nitrite, UA   Date Value Ref Range Status   02/19/2020 Negative Negative Final     Leukocytes, UA   Date Value Ref Range Status   02/19/2020 Negative Negative Final     pH, UA   Date Value Ref Range Status   02/19/2020 5.0 5.0 - 8.0 Final    and Acute  Labs CBC No results found for: WBC, HGB, PLT and Renal Panel  KSINo results found for: CREATININE, CRCLEARANCE, K, CALCIUM       IFrancisco, personally saw and examined the patient, reviewed most recent labs and imaging and agree with the above assessment

## 2021-06-06 NOTE — TELEPHONE ENCOUNTER
Spoke with patient who states that he cannot take the pain anymore and would like to have the stone taken out.  He will go into the emergency room at [facility] to evaluation today, he will remain npo incase of procedure.  Madeleine Hernandez RN

## 2021-06-06 NOTE — PROGRESS NOTES
Patient presents to the office today for emergency room visit follow-up.  Madeleine Hernandez RN

## 2021-06-06 NOTE — TELEPHONE ENCOUNTER
Patient is doing well, no issues following surgery for removal of left ureteral stone.    Defers further follow up at this time.    Discussed conservative dietary measures for stone prevention.    He is aware he harbors a 4 mm right renal stone.

## 2021-06-06 NOTE — TELEPHONE ENCOUNTER
Patient states that the oxycodone has been giving him hallucinations and he doesn't want to take it anymore.  However he is having severe stent pain, so he would like an alternative narcotic.  Please advise.  Madeleine Hernandez RN

## 2021-06-16 PROBLEM — N13.2 HYDRONEPHROSIS WITH URINARY OBSTRUCTION DUE TO URETERAL CALCULUS: Status: ACTIVE | Noted: 2020-02-19

## 2021-06-16 PROBLEM — N20.1 CALCULUS OF URETER: Status: ACTIVE | Noted: 2020-02-19

## 2021-06-16 PROBLEM — N20.0 CALCULUS OF KIDNEY: Status: ACTIVE | Noted: 2020-02-19

## 2021-06-16 PROBLEM — N20.0 KIDNEY STONE: Status: ACTIVE | Noted: 2020-02-24

## 2022-05-13 LAB
ALBUMIN UR-MCNC: 30 MG/DL
ANION GAP SERPL CALCULATED.3IONS-SCNC: <1 MMOL/L (ref 3–14)
APPEARANCE UR: ABNORMAL
BACTERIA #/AREA URNS HPF: ABNORMAL /HPF
BILIRUB UR QL STRIP: NEGATIVE
BUN SERPL-MCNC: 13 MG/DL (ref 7–30)
CALCIUM SERPL-MCNC: 9.6 MG/DL (ref 8.5–10.1)
CHLORIDE BLD-SCNC: 104 MMOL/L (ref 94–109)
CO2 SERPL-SCNC: 32 MMOL/L (ref 20–32)
COLOR UR AUTO: ABNORMAL
CREAT SERPL-MCNC: 0.77 MG/DL (ref 0.66–1.25)
ERYTHROCYTE [DISTWIDTH] IN BLOOD BY AUTOMATED COUNT: 12.3 % (ref 10–15)
GFR SERPL CREATININE-BSD FRML MDRD: >90 ML/MIN/1.73M2
GLUCOSE BLD-MCNC: 244 MG/DL (ref 70–99)
GLUCOSE UR STRIP-MCNC: >=1000 MG/DL
HCT VFR BLD AUTO: 44.6 % (ref 40–53)
HGB BLD-MCNC: 14.9 G/DL (ref 13.3–17.7)
HGB UR QL STRIP: ABNORMAL
KETONES UR STRIP-MCNC: ABNORMAL MG/DL
LEUKOCYTE ESTERASE UR QL STRIP: NEGATIVE
MCH RBC QN AUTO: 29.5 PG (ref 26.5–33)
MCHC RBC AUTO-ENTMCNC: 33.4 G/DL (ref 31.5–36.5)
MCV RBC AUTO: 88 FL (ref 78–100)
MUCOUS THREADS #/AREA URNS LPF: PRESENT /LPF
NITRATE UR QL: POSITIVE
PH UR STRIP: 5.5 [PH] (ref 5–7)
PLATELET # BLD AUTO: 266 10E3/UL (ref 150–450)
POTASSIUM BLD-SCNC: 4.2 MMOL/L (ref 3.4–5.3)
RBC # BLD AUTO: 5.05 10E6/UL (ref 4.4–5.9)
RBC URINE: >182 /HPF
SODIUM SERPL-SCNC: 135 MMOL/L (ref 133–144)
SP GR UR STRIP: 1.03 (ref 1–1.03)
SQUAMOUS EPITHELIAL: 2 /HPF
UROBILINOGEN UR STRIP-MCNC: NORMAL MG/DL
WBC # BLD AUTO: 9.4 10E3/UL (ref 4–11)
WBC URINE: 37 /HPF
YEAST #/AREA URNS HPF: ABNORMAL /HPF

## 2022-05-13 PROCEDURE — 36415 COLL VENOUS BLD VENIPUNCTURE: CPT | Performed by: EMERGENCY MEDICINE

## 2022-05-13 PROCEDURE — 96361 HYDRATE IV INFUSION ADD-ON: CPT

## 2022-05-13 PROCEDURE — 87086 URINE CULTURE/COLONY COUNT: CPT | Performed by: EMERGENCY MEDICINE

## 2022-05-13 PROCEDURE — 80048 BASIC METABOLIC PNL TOTAL CA: CPT | Performed by: EMERGENCY MEDICINE

## 2022-05-13 PROCEDURE — 81001 URINALYSIS AUTO W/SCOPE: CPT | Performed by: EMERGENCY MEDICINE

## 2022-05-13 PROCEDURE — 99285 EMERGENCY DEPT VISIT HI MDM: CPT | Mod: 25

## 2022-05-13 PROCEDURE — 85014 HEMATOCRIT: CPT | Performed by: EMERGENCY MEDICINE

## 2022-05-13 PROCEDURE — 96374 THER/PROPH/DIAG INJ IV PUSH: CPT

## 2022-05-13 PROCEDURE — 85027 COMPLETE CBC AUTOMATED: CPT | Performed by: EMERGENCY MEDICINE

## 2022-05-14 ENCOUNTER — HOSPITAL ENCOUNTER (EMERGENCY)
Facility: CLINIC | Age: 32
Discharge: HOME | End: 2022-05-14
Attending: EMERGENCY MEDICINE | Admitting: EMERGENCY MEDICINE

## 2022-05-14 ENCOUNTER — APPOINTMENT (OUTPATIENT)
Dept: CT IMAGING | Facility: CLINIC | Age: 32
End: 2022-05-14
Attending: EMERGENCY MEDICINE

## 2022-05-14 VITALS
DIASTOLIC BLOOD PRESSURE: 96 MMHG | OXYGEN SATURATION: 98 % | RESPIRATION RATE: 20 BRPM | HEART RATE: 96 BPM | TEMPERATURE: 98.3 F | SYSTOLIC BLOOD PRESSURE: 139 MMHG

## 2022-05-14 DIAGNOSIS — N10 PYELONEPHRITIS, ACUTE: ICD-10-CM

## 2022-05-14 LAB
HOLD SPECIMEN: NORMAL

## 2022-05-14 PROCEDURE — 250N000013 HC RX MED GY IP 250 OP 250 PS 637: Performed by: EMERGENCY MEDICINE

## 2022-05-14 PROCEDURE — 250N000011 HC RX IP 250 OP 636: Performed by: EMERGENCY MEDICINE

## 2022-05-14 PROCEDURE — 74176 CT ABD & PELVIS W/O CONTRAST: CPT

## 2022-05-14 PROCEDURE — 258N000003 HC RX IP 258 OP 636: Performed by: EMERGENCY MEDICINE

## 2022-05-14 RX ORDER — KETOROLAC TROMETHAMINE 15 MG/ML
10 INJECTION, SOLUTION INTRAMUSCULAR; INTRAVENOUS ONCE
Status: COMPLETED | OUTPATIENT
Start: 2022-05-14 | End: 2022-05-14

## 2022-05-14 RX ORDER — CIPROFLOXACIN 500 MG/1
500 TABLET, FILM COATED ORAL 2 TIMES DAILY
Qty: 14 TABLET | Refills: 0 | Status: SHIPPED | OUTPATIENT
Start: 2022-05-14 | End: 2022-05-16

## 2022-05-14 RX ORDER — CIPROFLOXACIN 500 MG/1
500 TABLET, FILM COATED ORAL ONCE
Status: COMPLETED | OUTPATIENT
Start: 2022-05-14 | End: 2022-05-14

## 2022-05-14 RX ADMIN — KETOROLAC TROMETHAMINE 10 MG: 15 INJECTION, SOLUTION INTRAMUSCULAR; INTRAVENOUS at 00:43

## 2022-05-14 RX ADMIN — SODIUM CHLORIDE 1000 ML: 9 INJECTION, SOLUTION INTRAVENOUS at 00:43

## 2022-05-14 RX ADMIN — CIPROFLOXACIN 500 MG: 500 TABLET, FILM COATED ORAL at 02:03

## 2022-05-14 ASSESSMENT — ENCOUNTER SYMPTOMS
FEVER: 0
HEMATURIA: 1
DIFFICULTY URINATING: 0
DYSURIA: 1
FLANK PAIN: 1

## 2022-05-14 NOTE — ED TRIAGE NOTES
Pt states intermittent right flank pain for one day with hematuria today. ABCs intact GCS 15     Triage Assessment     Row Name 05/13/22 2316       Triage Assessment (Adult)    Airway WDL WDL       Respiratory WDL    Respiratory WDL WDL       Skin Circulation/Temperature WDL    Skin Circulation/Temperature WDL WDL       Cardiac WDL    Cardiac WDL WDL       Peripheral/Neurovascular WDL    Peripheral Neurovascular WDL WDL       Cognitive/Neuro/Behavioral WDL    Cognitive/Neuro/Behavioral WDL WDL

## 2022-05-14 NOTE — ED PROVIDER NOTES
History   Chief Complaint:  Flank Pain     The history is provided by the patient.      Devyn Perez is a 31 year old male with history of kidney stones, hypertension, hyperlipidemia, and type 2 diabetes who presents with flank pain. Patient complains of intermittent right flank pain ongoing for a few months. Today he noticed hematuria and worsening pain prompting his visit. Reports this is similar but not as severe as previous kidney stones he has had. He had some dysuria yesterday. He has been taking Azo as needed for a few months. He frequently gets UTI's. No fever. No difficulty urinating.     Review of Systems   Constitutional: Negative for fever.   Genitourinary: Positive for dysuria, flank pain and hematuria. Negative for difficulty urinating.   All other systems reviewed and are negative.    Allergies:  Penicillins     Medications:  Proair hfa  Diovan   Crestor   Glucophage     Past Medical History:     Allergic state  Diabetes mellitus type 2   Kidney stone   Hypertension  Hyperlipidemia     Family History:    Mother: diabetes  Father: urolithiasis     Social History:  Presents to ED with visitor      Physical Exam     Patient Vitals for the past 24 hrs:   BP Temp Temp src Pulse Resp SpO2   05/14/22 0216 (!) 139/96 -- -- -- -- 98 %   05/13/22 2230 (!) 176/104 98.3  F (36.8  C) Oral 96 20 97 %       Physical Exam  Constitutional:  Oriented to person, place, and time.   HENT:   Head:    Normocephalic.   Mouth/Throat:   Oropharynx is clear and moist.   Eyes:    EOM are normal. Pupils are equal, round, and reactive to light.   Neck:    Neck supple.   Cardiovascular:  Normal rate, regular rhythm and normal heart sounds.      Exam reveals no gallop and no friction rub.       No murmur heard.  Pulmonary/Chest:  Effort normal and breath sounds normal.      No respiratory distress. No wheezes. No rales.      No reproducible chest wall pain.  Abdominal:   Soft. No distension. No tenderness. No rebound  and no guarding.      No pain on percussion of flanks.   Musculoskeletal:  Normal range of motion.   Neurological:   Alert and oriented to person, place, and time.           Moves all 4 extremities spontaneously    Skin:    No rash noted. No pallor.      Emergency Department Course     Imaging:  Abd/pelvis CT no contrast - Stone Protocol   Final Result   IMPRESSION:    Nonobstructive nephrolithiasis on the right.         Report per radiology    Laboratory:  Labs Ordered and Resulted from Time of ED Arrival to Time of ED Departure   ROUTINE UA WITH MICROSCOPIC REFLEX TO CULTURE - Abnormal       Result Value    Color Urine Orange (*)     Appearance Urine Slightly Cloudy (*)     Glucose Urine >=1000 (*)     Bilirubin Urine Negative      Ketones Urine Trace (*)     Specific Gravity Urine 1.035      Blood Urine Large (*)     pH Urine 5.5      Protein Albumin Urine 30  (*)     Urobilinogen Urine Normal      Nitrite Urine Positive (*)     Leukocyte Esterase Urine Negative      Bacteria Urine Few (*)     Budding Yeast Urine Few (*)     Mucus Urine Present (*)     RBC Urine >182 (*)     WBC Urine 37 (*)     Squamous Epithelials Urine 2 (*)    BASIC METABOLIC PANEL - Abnormal    Sodium 135      Potassium 4.2      Chloride 104      Carbon Dioxide (CO2) 32      Anion Gap <1 (*)     Urea Nitrogen 13      Creatinine 0.77      Calcium 9.6      Glucose 244 (*)     GFR Estimate >90     CBC WITH PLATELETS - Normal    WBC Count 9.4      RBC Count 5.05      Hemoglobin 14.9      Hematocrit 44.6      MCV 88      MCH 29.5      MCHC 33.4      RDW 12.3      Platelet Count 266     URINE CULTURE     Emergency Department Course:     Reviewed:  I reviewed nursing notes, vitals, past medical history and Care Everywhere    Assessments:  0030 I obtained history and examined the patient as noted above.   0207 I rechecked the patient and explained findings.     Interventions:  0043 Toradol 10 mg IV  0043 NS 1L IV   0203 Cipro 500 mg PO      Disposition:  The patient was discharged to home.     Impression & Plan         Medical Decision Making:  Mr. Carranza is a 31 year old male came in with right sided flank pain history of renal colic and urinary tract infection. Differential includes renal colic, UTI, pyelonephritis, acute appendicitis, or other causes. Workup thus far including urine and CT abdomen pelvis does show likely pyuria and what appears to be clinical pyelonephritis. I did obtain a CT to evaluate for possible ureteral calculi which is fortunately negative. He has an intrarenal stone which is nonsymptomatic and nonobstructive. He is otherwise appropriate for outpatient management. He will be discharged home with a course of Cipro. He is told to follow with oral hydration and follow up with his primary doctor. Return for any worsening abdominal pain, fever, or not tolerating PO. .     Diagnosis:    ICD-10-CM    1. Pyelonephritis, acute  N10        Discharge Medications:  Discharge Medication List as of 5/14/2022  2:05 AM      START taking these medications    Details   ciprofloxacin (CIPRO) 500 MG tablet Take 1 tablet (500 mg) by mouth 2 times daily for 7 days, Disp-14 tablet, R-0, Local Print             Scribe Disclosure:  Miguel Angel DONOVAN, am serving as a scribe at 12:22 AM on 5/14/2022 to document services personally performed by Fernando Yeboah MD based on my observations and the provider's statements to me.            Fernando Yeboah MD  05/14/22 8415

## 2022-05-16 ENCOUNTER — TELEPHONE (OUTPATIENT)
Dept: EMERGENCY MEDICINE | Facility: CLINIC | Age: 32
End: 2022-05-16

## 2022-05-16 DIAGNOSIS — N12 PYELONEPHRITIS: ICD-10-CM

## 2022-05-16 LAB — BACTERIA UR CULT: ABNORMAL

## 2022-05-16 RX ORDER — CEFPODOXIME PROXETIL 200 MG/1
200 TABLET, FILM COATED ORAL 2 TIMES DAILY
Qty: 18 TABLET | Refills: 0 | Status: SHIPPED | OUTPATIENT
Start: 2022-05-16 | End: 2022-05-25

## 2023-01-23 ENCOUNTER — NURSE TRIAGE (OUTPATIENT)
Dept: NURSING | Facility: CLINIC | Age: 33
End: 2023-01-23

## 2023-01-24 NOTE — TELEPHONE ENCOUNTER
Sister is caller; not with patient, limited information; no consent to communicate   Reporting he has been ill for  several  weeks with  fever cough   Denies any life threatening symptoms at present to her knowledge  Advised  to have patient/spouse call back for complete triage   Will comply  Gretchen Chu RN  FNA     .     Reason for Disposition    [1] Caller is not with the adult (patient) AND [2] reporting urgent symptoms    Protocols used: INFORMATION ONLY CALL - NO TRIAGE-A-